# Patient Record
Sex: MALE | Race: WHITE | URBAN - NONMETROPOLITAN AREA
[De-identification: names, ages, dates, MRNs, and addresses within clinical notes are randomized per-mention and may not be internally consistent; named-entity substitution may affect disease eponyms.]

---

## 2022-02-07 ENCOUNTER — OFFICE VISIT (OUTPATIENT)
Dept: URBAN - NONMETROPOLITAN AREA CLINIC 2 | Facility: CLINIC | Age: 82
End: 2022-02-07
Payer: MEDICARE

## 2022-02-07 ENCOUNTER — LAB OUTSIDE AN ENCOUNTER (OUTPATIENT)
Dept: URBAN - NONMETROPOLITAN AREA CLINIC 2 | Facility: CLINIC | Age: 82
End: 2022-02-07

## 2022-02-07 ENCOUNTER — WEB ENCOUNTER (OUTPATIENT)
Dept: URBAN - NONMETROPOLITAN AREA CLINIC 2 | Facility: CLINIC | Age: 82
End: 2022-02-07

## 2022-02-07 VITALS
HEIGHT: 70 IN | WEIGHT: 195 LBS | SYSTOLIC BLOOD PRESSURE: 170 MMHG | HEART RATE: 71 BPM | DIASTOLIC BLOOD PRESSURE: 77 MMHG | TEMPERATURE: 96.7 F | BODY MASS INDEX: 27.92 KG/M2

## 2022-02-07 DIAGNOSIS — R13.19 ESOPHAGEAL DYSPHAGIA: ICD-10-CM

## 2022-02-07 DIAGNOSIS — Z12.11 COLON CANCER SCREENING: ICD-10-CM

## 2022-02-07 PROCEDURE — 99204 OFFICE O/P NEW MOD 45 MIN: CPT | Performed by: NURSE PRACTITIONER

## 2022-02-07 RX ORDER — ACETAMINOPHEN 325 MG
1 TABLET AS NEEDED TABLET ORAL
Status: ACTIVE | COMMUNITY

## 2022-02-07 RX ORDER — ATORVASTATIN CALCIUM 40 MG/1
1 TABLET TABLET, FILM COATED ORAL ONCE A DAY
Status: ACTIVE | COMMUNITY

## 2022-02-07 RX ORDER — BLOOD SUGAR DIAGNOSTIC
AS DIRECTED STRIP MISCELLANEOUS
Status: ACTIVE | COMMUNITY

## 2022-02-07 NOTE — HPI-TODAY'S VISIT:
2/7/2022 Mr. Love is a 81-year-old male referred to me by Dr. Navi Tamayo for consultation of reflux and esophageal dysphagia.  A copy this note be sent to the referring physician.  He states for the past several months he has had increased dysphagia specifically to his pills.  So long as he chews up his food he does not have any issues.  He does have to swallow twice at times.  At times he does have to regurgitate the pill.  He saw Dr. Salamanca the ENT in [facility].  He had a laryngoscopy with concern for reflux and a stricture.  He placed him on pantoprazole 40 mg daily.  He also had an episode of palpitations and required EMS.  He follows up with Dr. Agrawal later this month with a history of CABG in 1999.  He is not on any anticoagulants and takes a baby aspirin daily.  Today his main complaint is dysphagia to pills.  MB

## 2022-03-17 ENCOUNTER — OFFICE VISIT (OUTPATIENT)
Dept: URBAN - METROPOLITAN AREA MEDICAL CENTER 1 | Facility: MEDICAL CENTER | Age: 82
End: 2022-03-17
Payer: MEDICARE

## 2022-03-17 DIAGNOSIS — K22.89 OTHER SPECIFIED DISEASE OF ESOPHAGUS: ICD-10-CM

## 2022-03-17 DIAGNOSIS — K29.60 ADENOPAPILLOMATOSIS GASTRICA: ICD-10-CM

## 2022-03-17 DIAGNOSIS — K22.2 ACQUIRED ESOPHAGEAL RING: ICD-10-CM

## 2022-03-17 PROCEDURE — 43239 EGD BIOPSY SINGLE/MULTIPLE: CPT | Performed by: INTERNAL MEDICINE

## 2022-03-17 PROCEDURE — 43249 ESOPH EGD DILATION <30 MM: CPT | Performed by: INTERNAL MEDICINE

## 2022-04-26 ENCOUNTER — DASHBOARD ENCOUNTERS (OUTPATIENT)
Age: 82
End: 2022-04-26

## 2022-04-26 ENCOUNTER — OFFICE VISIT (OUTPATIENT)
Dept: URBAN - NONMETROPOLITAN AREA CLINIC 2 | Facility: CLINIC | Age: 82
End: 2022-04-26
Payer: MEDICARE

## 2022-04-26 ENCOUNTER — WEB ENCOUNTER (OUTPATIENT)
Dept: URBAN - NONMETROPOLITAN AREA CLINIC 2 | Facility: CLINIC | Age: 82
End: 2022-04-26

## 2022-04-26 VITALS
HEART RATE: 76 BPM | DIASTOLIC BLOOD PRESSURE: 79 MMHG | HEIGHT: 70 IN | TEMPERATURE: 96.9 F | BODY MASS INDEX: 27.06 KG/M2 | WEIGHT: 189 LBS | SYSTOLIC BLOOD PRESSURE: 141 MMHG

## 2022-04-26 DIAGNOSIS — R13.19 ESOPHAGEAL DYSPHAGIA: ICD-10-CM

## 2022-04-26 DIAGNOSIS — Z12.11 COLON CANCER SCREENING: ICD-10-CM

## 2022-04-26 PROBLEM — 40890009: Status: ACTIVE | Noted: 2022-02-07

## 2022-04-26 PROBLEM — 305058001: Status: ACTIVE | Noted: 2022-02-07

## 2022-04-26 PROCEDURE — 99214 OFFICE O/P EST MOD 30 MIN: CPT | Performed by: NURSE PRACTITIONER

## 2022-04-26 RX ORDER — ATORVASTATIN CALCIUM 40 MG/1
1 TABLET TABLET, FILM COATED ORAL ONCE A DAY
Status: ACTIVE | COMMUNITY

## 2022-04-26 RX ORDER — BLOOD SUGAR DIAGNOSTIC
AS DIRECTED STRIP MISCELLANEOUS
Status: ACTIVE | COMMUNITY

## 2022-04-26 RX ORDER — ACETAMINOPHEN 325 MG
1 TABLET AS NEEDED TABLET ORAL
Status: ACTIVE | COMMUNITY

## 2022-04-26 NOTE — HPI-TODAY'S VISIT:
2/7/2022 Mr. Love is a 81-year-old male referred to me by Dr. Navi Tamayo for consultation of reflux and esophageal dysphagia.  A copy this note be sent to the referring physician.  He states for the past several months he has had increased dysphagia specifically to his pills.  So long as he chews up his food he does not have any issues.  He does have to swallow twice at times.  At times he does have to regurgitate the pill.  He saw Dr. Salamanca the ENT in [facility].  He had a laryngoscopy with concern for reflux and a stricture.  He placed him on pantoprazole 40 mg daily.  He also had an episode of palpitations and required EMS.  He follows up with Dr. Agrawal later this month with a history of CABG in 1999.  He is not on any anticoagulants and takes a baby aspirin daily.  Today his main complaint is dysphagia to pills.  MB 4/26/2022 Mr. Love presents for endoscopy follow-up.  His EGD reveals gastritis and mild esophagitis status post dilation.  His dysphagia specifically with pills has resolved.  He is on Protonix 40 mg daily.  He does have CKD stage III.  He would like to try and wean off his PPI.  He agrees to cut the 20 mg daily and try and wean off over the summer.  He has excessive amounts of gas.  He does take multiple supplements including garlic supplement, he does agree this may be contributing.  His bowels are moving regularly and have improved tremendously since stopping caffeine specifically coffee in the morning.  He has not had a colonoscopy in the past and does not wish to pursue for now.  Today he is doing well otherwise.  MB

## 2023-01-18 ENCOUNTER — ERX REFILL RESPONSE (OUTPATIENT)
Dept: URBAN - NONMETROPOLITAN AREA CLINIC 2 | Facility: CLINIC | Age: 83
End: 2023-01-18

## 2023-04-11 ENCOUNTER — OFFICE VISIT (OUTPATIENT)
Dept: URBAN - NONMETROPOLITAN AREA CLINIC 2 | Facility: CLINIC | Age: 83
End: 2023-04-11